# Patient Record
Sex: FEMALE | Race: WHITE
[De-identification: names, ages, dates, MRNs, and addresses within clinical notes are randomized per-mention and may not be internally consistent; named-entity substitution may affect disease eponyms.]

---

## 2018-03-15 ENCOUNTER — HOSPITAL ENCOUNTER (EMERGENCY)
Dept: HOSPITAL 45 - C.EDB | Age: 43
Discharge: HOME | End: 2018-03-15
Payer: COMMERCIAL

## 2018-03-15 VITALS
HEIGHT: 62.99 IN | WEIGHT: 159.39 LBS | HEIGHT: 62.99 IN | BODY MASS INDEX: 28.24 KG/M2 | BODY MASS INDEX: 28.24 KG/M2 | WEIGHT: 159.39 LBS

## 2018-03-15 VITALS — OXYGEN SATURATION: 98 % | SYSTOLIC BLOOD PRESSURE: 145 MMHG | HEART RATE: 66 BPM | DIASTOLIC BLOOD PRESSURE: 84 MMHG

## 2018-03-15 VITALS — OXYGEN SATURATION: 97 %

## 2018-03-15 VITALS — TEMPERATURE: 98.6 F

## 2018-03-15 DIAGNOSIS — L40.59: ICD-10-CM

## 2018-03-15 DIAGNOSIS — Z79.3: ICD-10-CM

## 2018-03-15 DIAGNOSIS — K52.9: ICD-10-CM

## 2018-03-15 DIAGNOSIS — R07.89: Primary | ICD-10-CM

## 2018-03-15 LAB
ALBUMIN SERPL-MCNC: 3.6 GM/DL (ref 3.4–5)
ALP SERPL-CCNC: 83 U/L (ref 45–117)
ALT SERPL-CCNC: 31 U/L (ref 12–78)
AST SERPL-CCNC: 16 U/L (ref 15–37)
BUN SERPL-MCNC: 13 MG/DL (ref 7–18)
CALCIUM SERPL-MCNC: 9 MG/DL (ref 8.5–10.1)
CK MB SERPL-MCNC: 2 NG/ML (ref 0.5–3.6)
CO2 SERPL-SCNC: 25 MMOL/L (ref 21–32)
CREAT SERPL-MCNC: 0.78 MG/DL (ref 0.6–1.2)
EOSINOPHIL NFR BLD AUTO: 360 K/UL (ref 130–400)
GLUCOSE SERPL-MCNC: 88 MG/DL (ref 70–99)
HCT VFR BLD CALC: 40.8 % (ref 37–47)
HGB BLD-MCNC: 14 G/DL (ref 12–16)
INR PPP: 0.9 (ref 0.9–1.1)
MCH RBC QN AUTO: 31.1 PG (ref 25–34)
MCHC RBC AUTO-ENTMCNC: 34.3 G/DL (ref 32–36)
MCV RBC AUTO: 90.7 FL (ref 80–100)
PMV BLD AUTO: 9.7 FL (ref 7.4–10.4)
POTASSIUM SERPL-SCNC: 3.8 MMOL/L (ref 3.5–5.1)
PROT SERPL-MCNC: 7.8 GM/DL (ref 6.4–8.2)
PTT PATIENT: 25 SECONDS (ref 21–31)
RED CELL DISTRIBUTION WIDTH CV: 13.7 % (ref 11.5–14.5)
RED CELL DISTRIBUTION WIDTH SD: 45 FL (ref 36.4–46.3)
SODIUM SERPL-SCNC: 138 MMOL/L (ref 136–145)
WBC # BLD AUTO: 9.98 K/UL (ref 4.8–10.8)

## 2018-03-15 NOTE — DIAGNOSTIC IMAGING REPORT
CT ANGIOGRAM OF THE CHEST



CLINICAL HISTORY: Atypical chest pain.



COMPARISON STUDY:  Chest x-ray dated 3/15/2018.



TECHNIQUE: Following the IV administration of 85 cc of Optiray 320, CT angiogram

of the chest was performed from the upper abdomen to the thoracic inlet

utilizing the pulmonary embolus protocol. Images are reviewed in the axial,

sagittal, and coronal planes. 3-D MIPS images are created and assessed. IV

contrast was administered without complication.  A dose lowering technique was

utilized adhering to the principles of ALARA.



CT DOSE: 333.16 mGycm



FINDINGS:



Thyroid: Imaged portions of the thyroid gland are normal in size and

attenuation.



Thoracic aorta: The thoracic aorta is normal in caliber and demonstrates

standard 3-vessel arch anatomy. No dissection is seen.



Pulmonary vasculature: The pulmonary trunk is normal in caliber. There are no

filling defects identified in main, lobar, or segmental pulmonary branches to

suggest pulmonary embolus.



Heart: The heart is normal in size and configuration, and without pericardial

effusion.



Lungs and pleural spaces: Evaluation of lung parenchyma is modestly degraded by

motion artifact. No airspace consolidation or pleural effusion is identified.

There is dependent atelectasis. The trachea and central airways are clear.



Mediastinum: There is no mediastinal lymphadenopathy.



Susan: Clear.



Axillae: There is no axillary lymphadenopathy.



Upper abdomen: There are at least 5 low-attenuation hepatic lesions identified.

The largest measures up to 3.3 cm. These are seen in the left lobe on images #4,

#20, and #52 and in the right lobe on images #49 and #31. Punctate

nonobstructing calculi are seen in the upper pole of the left kidney.



Skeletal structures: No lytic or blastic bony lesions are seen.





IMPRESSION:



1. There is no evidence of pulmonary embolus in the main, lobar, or segmental

pulmonary arteries. 



2. The lungs are clear.



3. There are least 5 low-attenuation hepatic lesions measuring up to 3.3 cm.

These are pathologically indeterminant, and could potentially represent

hemangiomas. Follow-up with a nonemergent contrast-enhanced MRI of the liver is

recommended for further assessment.







Electronically signed by:  Ajay Resendiz M.D.

3/15/2018 6:35 PM



Dictated Date/Time:  3/15/2018 6:30 PM

## 2018-03-15 NOTE — DIAGNOSTIC IMAGING REPORT
CHEST ONE VIEW PORTABLE



HISTORY:  43 years-old Female chest pain acute atypical chest pain



COMPARISON: None available



TECHNIQUE: Portable AP view of the chest



FINDINGS: 

Cardiomediastinal and hilar silhouettes are within normal limits. There is no

pneumothorax or pleural effusion. Subsegmental left basilar opacities are noted.

No lobar airspace consolidation or overt pulmonary edema. The bones of the chest

appear grossly intact.



IMPRESSION: Subsegmental left basilar opacities favor atelectasis. 







The above report was generated using voice recognition software. It may contain

grammatical, syntax or spelling errors.







Electronically signed by:  Levy Nicole M.D.

3/15/2018 5:16 PM



Dictated Date/Time:  3/15/2018 5:14 PM

## 2018-03-15 NOTE — EMERGENCY ROOM VISIT NOTE
History


Report prepared by Johan:  Chilango Alvarez


Under the Supervision of:  Dr. Dione García D.O.


First contact with patient:  16:54


Chief Complaint:  CHEST PAIN


Stated Complaint:  SHARP LT SIDE PAIN, BLOOD WORK


Nursing Triage Summary:  


Patient c/o sharp left upper abdominal/chest  pain under ribs. Denies N/V/D 


Patient recently traveled to and from [facility]. Seen by Rosalina Oates PA-C and sent 


for evaluation.





History of Present Illness


The patient is a 43 year old female who presents to the Emergency Room with 

complaints of worsening left side chest pain starting this morning. She 

describes the pain as a piercing pain, and she states that the pain is worsened 

with bending over and deep breathing. The patient states that it was okay 

earlier in the morning, and she ate lunch and afterwards it was much worse. She 

notes that she went to her PCP this morning, and they thought it was 

musculoskeletal, though she states that she recently drove 15 hours, and she is 

worried about a blood clot. She has a history of colitis, though she states 

that this pain is different than usual. The patient states that she currently 

feels hot, and she states that she has been having some post nasal drip. She 

denies any nausea, vomiting, leg swelling, back pain, any thyroid problems, and 

any family history of heart disease at a young age. The patient states that she 

took two Tylenol for the pain, and it did not really help. She additionally 

notes that she had a root canal performed a week ago.





   Source of History:  patient


   Onset:  this morning


   Position:  chest (left)


   Quality:  other (piercing)


   Timing:  worsening


   Modifying Factors (Worsening):  other (bending over and deep breathing)


   Associated Symptoms:  No nausea, No vomiting, No back pain





Review of Systems


See HPI for pertinent positives & negatives. A total of 10 systems reviewed and 

were otherwise negative.





Past Medical & Surgical


Medical Problems:


(1) Dermatitis


(2) Psoriatic arthropathy


Surgical Problems:


(1) History of dental surgery








Social History


Smoking Status:  Never Smoker


Marital Status:  


Occupation Status:  employed





Current/Historical Medications


Scheduled


Birth Control Pills (Birth Control Pills), 1 TAB PO DAILY





Allergies


Coded Allergies:  


     No Known Allergies (Verified , 3/15/18)





Physical Exam


Vital Signs











  Date Time  Temp Pulse Resp B/P (MAP) Pulse Ox O2 Delivery O2 Flow Rate FiO2


 


3/15/18 20:42  66 17 145/84 98   


 


3/15/18 19:28  70      


 


3/15/18 16:59     98 Room Air  


 


3/15/18 16:59     97 Room Air  


 


3/15/18 16:33      Room Air  


 


3/15/18 16:29 37.0 83 16 176/95 95 Room Air  











Physical Exam


GENERAL: alert, well appearing, well nourished, no distress, non-toxic 


EYE EXAM: normal conjunctiva, PERRL and EOM's grossly intact


OROPHARYNX: no exudate, no erythema, lips, buccal mucosa, and tongue normal and 

mucous membranes are moist


NECK: supple, no nuchal rigidity, no adenopathy, non-tender


LUNGS: Clear to auscultation. No wheezes rhonchi and rales. Normal chest wall 

mechanics


HEART: no murmurs, S1 normal and S2 normal 


CHEST: Pain along the left costal margin. 


ABDOMEN: abdomen soft, non-tender, normo-active bowel sounds, no masses, no 

rebound or guarding. 


BACK: Back is symmetrical on inspection and there is no deformity, no midline 

tenderness, no CVA tenderness. 


SKIN: no rashes and no bruising 


UPPER EXTREMITIES: upper extremities are grossly normal. 


LOWER EXTREMITIES: No pitting edema.


NEURO EXAM: Normal sensorium, cranial nerves II-XII grossly intact, normal 

speech,  no gross weakness of arms, no gross weakness of legs.





Medical Decision & Procedures


ER Provider


Diagnostic Interpretation:


Radiology results have been interpreted by the radiologist and reviewed by me.











CHEST ONE VIEW PORTABLE





HISTORY:  43 years-old Female chest pain acute atypical chest pain





COMPARISON: None available





TECHNIQUE: Portable AP view of the chest





FINDINGS: 


Cardiomediastinal and hilar silhouettes are within normal limits. There is no


pneumothorax or pleural effusion. Subsegmental left basilar opacities are noted.


No lobar airspace consolidation or overt pulmonary edema. The bones of the chest


appear grossly intact.





IMPRESSION: Subsegmental left basilar opacities favor atelectasis. 











The above report was generated using voice recognition software. It may contain


grammatical, syntax or spelling errors.





Electronically signed by:  Levy Nicole M.D.


3/15/2018 5:16 PM





Dictated Date/Time:  3/15/2018 5:14 PM























CT ANGIOGRAM OF THE CHEST





CLINICAL HISTORY: Atypical chest pain.





COMPARISON STUDY:  Chest x-ray dated 3/15/2018.





TECHNIQUE: Following the IV administration of 85 cc of Optiray 320, CT angiogram


of the chest was performed from the upper abdomen to the thoracic inlet


utilizing the pulmonary embolus protocol. Images are reviewed in the axial,


sagittal, and coronal planes. 3-D MIPS images are created and assessed. IV


contrast was administered without complication.  A dose lowering technique was


utilized adhering to the principles of ALARA.





CT DOSE: 333.16 mGycm





FINDINGS:





Thyroid: Imaged portions of the thyroid gland are normal in size and


attenuation.





Thoracic aorta: The thoracic aorta is normal in caliber and demonstrates


standard 3-vessel arch anatomy. No dissection is seen.





Pulmonary vasculature: The pulmonary trunk is normal in caliber. There are no


filling defects identified in main, lobar, or segmental pulmonary branches to


suggest pulmonary embolus.





Heart: The heart is normal in size and configuration, and without pericardial


effusion.





Lungs and pleural spaces: Evaluation of lung parenchyma is modestly degraded by


motion artifact. No airspace consolidation or pleural effusion is identified.


There is dependent atelectasis. The trachea and central airways are clear.





Mediastinum: There is no mediastinal lymphadenopathy.





Susan: Clear.





Axillae: There is no axillary lymphadenopathy.





Upper abdomen: There are at least 5 low-attenuation hepatic lesions identified.


The largest measures up to 3.3 cm. These are seen in the left lobe on images #4,


#20, and #52 and in the right lobe on images #49 and #31. Punctate


nonobstructing calculi are seen in the upper pole of the left kidney.





Skeletal structures: No lytic or blastic bony lesions are seen.








IMPRESSION:





1. There is no evidence of pulmonary embolus in the main, lobar, or segmental


pulmonary arteries. 





2. The lungs are clear.





3. There are least 5 low-attenuation hepatic lesions measuring up to 3.3 cm.


These are pathologically indeterminant, and could potentially represent


hemangiomas. Follow-up with a nonemergent contrast-enhanced MRI of the liver is


recommended for further assessment.





Electronically signed by:  Ajay Resendiz M.D.


3/15/2018 6:35 PM





Dictated Date/Time:  3/15/2018 6:30 PM





Laboratory Results


3/15/18 15:45








3/15/18 15:45

















Test


  3/15/18


15:45 3/15/18


16:59 3/15/18


19:34


 


Red Blood Count


  4.50 M/uL


(4.2-5.4) 


  


 


 


Mean Corpuscular Volume


  90.7 fL


() 


  


 


 


Mean Corpuscular Hemoglobin


  31.1 pg


(25-34) 


  


 


 


Mean Corpuscular Hemoglobin


Concent 34.3 g/dl


(32-36) 


  


 


 


RDW Standard Deviation


  45.0 fL


(36.4-46.3) 


  


 


 


RDW Coefficient of Variation


  13.7 %


(11.5-14.5) 


  


 


 


Mean Platelet Volume


  9.7 fL


(7.4-10.4) 


  


 


 


Prothrombin Time


  9.3 SECONDS


(9.0-12.0) 


  


 


 


Prothromb Time International


Ratio 0.9 (0.9-1.1) 


  


  


 


 


Activated Partial


Thromboplast Time 25.0 SECONDS


(21.0-31.0) 


  


 


 


Partial Thromboplastin Ratio 1.0   


 


D-Dimer


  760 ug/L FEU


(0-500) 


  


 


 


Anion Gap


  8.0 mmol/L


(3-11) 


  


 


 


Est Creatinine Clear Calc


Drug Dose 90.4 ml/min 


  


  


 


 


Estimated GFR (


American) 109.4 


  


  


 


 


Estimated GFR (Non-


American 94.4 


  


  


 


 


BUN/Creatinine Ratio 16.8 (10-20)   


 


Calcium Level


  9.0 mg/dl


(8.5-10.1) 


  


 


 


Total Bilirubin


  0.2 mg/dl


(0.2-1) 


  


 


 


Aspartate Amino Transf


(AST/SGOT) 16 U/L (15-37) 


  


  


 


 


Alanine Aminotransferase


(ALT/SGPT) 31 U/L (12-78) 


  


  


 


 


Alkaline Phosphatase


  83 U/L


() 


  


 


 


Total Creatine Kinase


  120 U/L


() 


  


 


 


Creatine Kinase MB


  2.0 ng/ml


(0.5-3.6) 


  


 


 


Creatine Kinase MB Ratio 1.7 (0-3.0)   


 


Total Protein


  7.8 gm/dl


(6.4-8.2) 


  


 


 


Albumin


  3.6 gm/dl


(3.4-5.0) 


  


 


 


Globulin


  4.2 gm/dl


(2.5-4.0) 


  


 


 


Albumin/Globulin Ratio 0.9 (0.9-2)   


 


Bedside D-Dimer


  


  > 450 ng/mlFEU


(0-450) 


 


 


Bedside Troponin I


  


  


  < 0.030 ng/ml


(0-0.045)








Laboratory results per my review.





Medications Administered











 Medications


  (Trade)  Dose


 Ordered  Sig/Maddie


 Route  Start Time


 Stop Time Status Last Admin


Dose Admin


 


 Fentanyl Citrate


  (Fentanyl Inj)  50 mcg  NOW  STAT


 IV  3/15/18 17:33


 3/15/18 17:34 DC 3/15/18 17:54


50 MCG


 


 Ketorolac


 Tromethamine


  (Toradol Inj)  30 mg  NOW  STAT


 IV  3/15/18 19:04


 3/15/18 19:05 DC 3/15/18 19:21


30 MG


 


 Famotidine


  (Pepcid 20mg Iv


 Push)  20 mg  ONE  STAT


 IV  3/15/18 19:04


 3/15/18 19:05 DC 3/15/18 19:25


20 MG











ECG Per My Interpretation


Indication:  chest pain


Rate (beats per minute):  72


Rhythm:  sinus rhythm


Findings:  RBBB (borderline), no acute ischemic change, no ectopy, other (

Normal axis. No evidence for right heart strain.)





ED Course


1654: The patient was evaluated in room A2. A complete history and physical 

exam was performed. The patient's outpatient office note was reviewed.





1733: Fentanyl 50mcg IV





1904: Famotidine 20mg IV, Toradol 30mg IV





1907: I reevaluated the patient, and I updated her on the results.





1935: Upon reevaluation, the patient is feeling better. I discussed the 

findings and the treatment plan with the patient.  She verbalizes agreement and 

understanding.  She was discharged home.





Medical Decision


Differential diagnosis:


Etiologies such as cardiac ischemia, aortic dissection, pulmonary embolism, 

pneumonia, pneumothorax, musculoskeletal, infections, pericarditis, myocarditis

, esophageal rupture, gastrointestinal, as well as others were entertained. 





Patient well-appearing here despite complaints, no risk factors for ACS and 

troponins 2 negative.  Patient's d-dimer elevated however CT angiogram the 

chest was negative.  No evidence of other vascular pathology or other acute 

infectious etiology.  Discussed with patient pain may be from musculoskeletal 

strain, GERD, or pleurisy.  Discussed with patient over-the-counter use of pain 

medications, adequate hydration, avoidance of acidic foods as a precaution.  

Discussed close follow-up with family doctor as a precaution particularly if 

pain persists as she may need additional cardiac testing.  Discussed with her 

symptoms to watch and return to the emergency room for, she verbalized 

understanding was agreeable with plan.  Patient felt mildly improved her 

following administration of Toradol, vital signs otherwise stable throughout.  

Mild hypertension noted here however likely related to pain and anxiety 

regarding situation.  I do not suspect hypertensive urgency/emergency.





Medication Reconcilliation


Current Medication List:  was personally reviewed by me





Blood Pressure Screening


Patient's blood pressure:  Elevated blood pressure


Blood pressure disposition:  Elevated BP felt to be situational





Impression





 Primary Impression:  


 Non-cardiac chest pain





Scribe Attestation


The scribe's documentation has been prepared under my direction and personally 

reviewed by me in its entirety. I confirm that the note above accurately 

reflects all work, treatment, procedures, and medical decision making performed 

by me.





Departure Information


Dispostion


Home / Self-Care





Referrals


No Doctor, Assigned (PCP)





Forms


Call Back Authorization, HOME CARE DOCUMENTATION FORM,                         

                                       IMPORTANT VISIT INFORMATION





Patient Instructions


My [facility]





Additional Instructions





You may use Tylenol and ibuprofen as needed for pain.  Please make sure 

drinking plenty of water.  Did not take Motrin/Advil/Aleve on an empty stomach 

as it can irritate your stomach.  Please follow-up with your family doctor as a 

precaution.  If you have any worsening pain, develop trouble breathing, 

increased cough, fevers or chills, dizziness, you have any other new or 

concerning symptoms, please return the emergency room.